# Patient Record
(demographics unavailable — no encounter records)

---

## 2025-01-30 NOTE — ASSESSMENT
[FreeTextEntry1] : Assessment: Asthma:  plan: Stress compliance with inhalers. Renewed today. start PRN albuterol start ICS needs PFT at some point  Allergic rhinitis: I feel the patient has a post nasal drip syndrome due to allergen exposure  Rx to use saline nasal washes BID. start azelastine at HS   There is a high clinical suspicion for PASHA, the patient has classic signs of obstructive sleep apnea. Obesity There is EDS associated with the sleep apnea.   when the asthma is stable I will order home sleep testing, and I will see the patient in F/U to arrange for therapies as needed. Weight loss was discussed with the patient. The patient was counseled against driving in a sleepy state.

## 2025-01-30 NOTE — PHYSICAL EXAM
[No Acute Distress] : no acute distress [Enlarged Base of the Tongue] : enlarged base of the tongue [Nasal congestion] : nasal congestion [IV] : Mallampati Class: IV [Normal Appearance] : normal appearance [No Neck Mass] : no neck mass [Normal Rate/Rhythm] : normal rate/rhythm [Normal S1, S2] : normal s1, s2 [No Murmurs] : no murmurs [No Resp Distress] : no resp distress [Clear to Auscultation Bilaterally] : clear to auscultation bilaterally [No Abnormalities] : no abnormalities [Benign] : benign [Normal Gait] : normal gait [No Clubbing] : no clubbing [No Cyanosis] : no cyanosis [No Edema] : no edema [FROM] : FROM [Normal Color/ Pigmentation] : normal color/ pigmentation [No Focal Deficits] : no focal deficits [Oriented x3] : oriented x3 [Normal Affect] : normal affect

## 2025-01-30 NOTE — HISTORY OF PRESENT ILLNESS
[TextBox_4] : The patient presents with SOB, cough and nasal congestion that has been present over the last few months, She feels worse in the AM and is wheezing 3-4/7 days. She does have documented exercise induced asthma and uses albuterol.  She has marked nasal congestion and is constantly clering her throat.  There is a question of PASHA by her dentist, but she states that there is no snoring. Her father is on CPAP.

## 2025-05-01 NOTE — HISTORY OF PRESENT ILLNESS
[TextBox_4] : 23 y/o F presents for above   Chronic sinusitis/ PND  -On nasal regimen w/ azelastine   Asthma Started arnuity last visit, reports resolution of troublesome symptoms on medication  - Seasonal/ environmental triggers   Suspected PASHA:  Reports waking unrefreshed, daytime somnolence, waking w/ headache, nocturia  Goes to bed 2230- 0615, wakes up 3-4x/night  Sleep quality rated as: poor Daytime napping: yes Family history: Father Previous sleep study: none

## 2025-05-01 NOTE — ASSESSMENT
[FreeTextEntry1] : PASHA:  - high clinical suspicion based on family history, symptomology, and physical exam  - HST ordered  - Educated patient about what to expect regarding sleep study ordering, testing, equipment management and compliance -  Pt educated about risks of inadequate management of PASHA and/or CPAP noncompliance: stroke, MI, arrythmias, HTN, safety, depression or worsening of other commodities   Chronic sinusitis/ PND - cont nasal regimen w/ simple saline 2x/day and azelastine  -med refilled  -Rec addition of PO antihistamine  Asthma -cont present regimen  - arnuity refilled  - albuterol ordered  - Encouraged adherence to maintenance therapy for long-term control and exacerbation prevention. Rescue inhalers PRN. Counseled pt about need to escalate therapy if using rescue inhaler >3x/week.

## 2025-06-04 NOTE — REASON FOR VISIT
[Home] : at home, [unfilled] , at the time of the visit. [Medical Office: ([facility])___] : at the medical office located in  [Telehealth (audio & video)] : This visit was provided via telehealth using real-time 2-way audio visual technology. [Verbal consent obtained from patient] : the patient, [unfilled] [Follow-Up] : a follow-up visit [Sleep Apnea] : sleep apnea

## 2025-06-04 NOTE — ASSESSMENT
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